# Patient Record
Sex: MALE | Race: WHITE | NOT HISPANIC OR LATINO | Employment: OTHER | URBAN - METROPOLITAN AREA
[De-identification: names, ages, dates, MRNs, and addresses within clinical notes are randomized per-mention and may not be internally consistent; named-entity substitution may affect disease eponyms.]

---

## 2021-05-04 PROBLEM — I10 ESSENTIAL HYPERTENSION: Status: ACTIVE | Noted: 2021-05-04

## 2021-05-04 PROBLEM — H93.13 TINNITUS, BILATERAL: Status: ACTIVE | Noted: 2021-05-04

## 2021-05-04 PROBLEM — E04.1 THYROID NODULE: Status: ACTIVE | Noted: 2021-05-04

## 2021-05-04 PROBLEM — N52.01 ERECTILE DYSFUNCTION DUE TO ARTERIAL INSUFFICIENCY: Status: ACTIVE | Noted: 2021-05-04

## 2021-05-04 PROBLEM — H91.93 BILATERAL HEARING LOSS: Status: ACTIVE | Noted: 2021-05-04

## 2021-05-04 PROBLEM — Z87.891 FORMER SMOKER: Status: ACTIVE | Noted: 2021-05-04

## 2021-10-04 ENCOUNTER — OFFICE VISIT (OUTPATIENT)
Dept: ENDOCRINOLOGY | Facility: MEDICAL CENTER | Age: 61
End: 2021-10-04
Attending: INTERNAL MEDICINE
Payer: COMMERCIAL

## 2021-10-04 ENCOUNTER — TELEPHONE (OUTPATIENT)
Dept: ENDOCRINOLOGY | Facility: MEDICAL CENTER | Age: 61
End: 2021-10-04

## 2021-10-04 VITALS
BODY MASS INDEX: 29.7 KG/M2 | HEART RATE: 66 BPM | HEIGHT: 68 IN | WEIGHT: 196 LBS | OXYGEN SATURATION: 93 % | SYSTOLIC BLOOD PRESSURE: 130 MMHG | DIASTOLIC BLOOD PRESSURE: 82 MMHG

## 2021-10-04 DIAGNOSIS — E55.9 VITAMIN D DEFICIENCY: ICD-10-CM

## 2021-10-04 DIAGNOSIS — F41.8 PERFORMANCE ANXIETY: ICD-10-CM

## 2021-10-04 DIAGNOSIS — E04.1 THYROID NODULE: ICD-10-CM

## 2021-10-04 DIAGNOSIS — N52.01 ERECTILE DYSFUNCTION DUE TO ARTERIAL INSUFFICIENCY: ICD-10-CM

## 2021-10-04 PROCEDURE — 99211 OFF/OP EST MAY X REQ PHY/QHP: CPT | Performed by: INTERNAL MEDICINE

## 2021-10-04 PROCEDURE — 99204 OFFICE O/P NEW MOD 45 MIN: CPT | Performed by: INTERNAL MEDICINE

## 2021-10-04 ASSESSMENT — PATIENT HEALTH QUESTIONNAIRE - PHQ9: CLINICAL INTERPRETATION OF PHQ2 SCORE: 0

## 2021-10-04 NOTE — TELEPHONE ENCOUNTER
1. Caller Name: Lamont Ballard                        Call Back Number: [phone] (home)      2. Message: Patient called in and I have spoke with him and he asked if we had his US report. I have looked into patient's media and we do have his US report from [facility].

## 2021-10-04 NOTE — PROGRESS NOTES
Chief Complaint: Consult requested by Rodney Cardoso M.D. for evaluation of Thyroid Nodule    HPI:     Lamont Ballard is a 61 y.o. male with history of Thyroid Nodule diagnosed 2010 discovered by his pcp in [place] and is here for initial evaluation.      Thyroid US from June 9/20/2021 was reviewed and discussed with the patient in detail.  This showed Dominant solid isoechoic nodule in the right isthmus bridge measuring 1.9 cm in the greatest dimension and a 4 mm cystic nodule in the left lobe.     He denies prior thyroid surgery.  He reports 2 biopsies for the same dominant right sided isthmus nodule returning back as indeterminate.  Genetic testing or molecular testing was not done.  This is why he was referred to endocrinology.    He reports that he had a TSH test with his primary care and it was normal.  We do not have records unfortunately.    He denies a family history of thyroid cancer.  He admits to radiation exposure in his teens as he worked in the nuclear power plant previously for 6 months.  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      ROS:      CONS:     No fever, no chills, no weight loss, no fatigue   EYES:      No diplopia, no blurry vision, no redness of eyes, no swelling of eyelids   ENT:    No hearing loss, No ear pain, No sore throat, no dysphagia, no neck swelling   CV:     No chest pain, no palpitations, no claudication, no orthopnea, no PND   PULM:    No SOB, no cough, no hemoptysis, no wheezing    GI:   No nausea, no vomiting, no diarrhea, no constipation, no bloody stools   :  Passing urine well, no dysuria, no hematuria   ENDO:   No polyuria, no polydipsia, no heat intolerance,  no cold intolerance   NEURO: No headaches, no dizziness, no convulsions, no tremors   MUSC:  No joint swellings, no arthralgias, no myalgias, no weakness   SKIN:   No rash, no ulcers, no dry skin   PSYCH:   No depression, no anxiety, no difficulty sleeping       Past Medical History:  Patient Active Problem List    Diagnosis Date Noted   • Former smoker 05/04/2021   • Thyroid nodule 05/04/2021   • Bilateral hearing loss 05/04/2021   • Erectile dysfunction due to arterial insufficiency 05/04/2021   • Essential hypertension 05/04/2021   • Tinnitus, bilateral 05/04/2021       Past Surgical History:  Past Surgical History:   Procedure Laterality Date   • ARTHROPLASTY          Allergies:  Penicillin g     Current Medications:    Current Outpatient Medications:   •  tadalafil (CIALIS) 20 MG tablet, Take 1 Tablet by mouth as needed for Erectile Dysfunction., Disp: 10 Tablet, Rfl: 3  •  propranolol (INDERAL) 10 MG Tab, Take 10 mg by mouth 3 times a day., Disp: , Rfl:   •  ciclopirox (PENLAC) 8 % solution, APPLY SOLUTION TO NAILS EVERY NIGHT AT BEDTIME, Disp: , Rfl:   •  ketoconazole (NIZORAL) 2 % Cream, APPLY TO FEET BILATERALLY EVERY DAY FOR 8 WEEKS, Disp: , Rfl:   •  urea 40 % Cream, APPLY TO FEET BILATERALLY TWICE DAILY FOR HYPERKERATOSIS, Disp: , Rfl:   •  Multiple Vitamins-Minerals (VITAMIN D3 COMPLETE PO), Take  by mouth., Disp: , Rfl:   •  MILK THISTLE PO, Take  by mouth., Disp: , Rfl:   •  B Complex-Folic Acid (B COMPLEX MAXI PO), Take  by mouth., Disp: , Rfl:   •  TURMERIC PO, Take  by mouth., Disp: , Rfl:   •  losartan (COZAAR) 50 MG Tab, Take 50 mg by mouth every day. Taking a half a pill. (Patient not taking: Reported on 10/4/2021), Disp: , Rfl:     Social History:  Social History     Socioeconomic History   • Marital status: Unknown     Spouse name: Not on file   • Number of children: Not on file   • Years of education: Not on file   • Highest education level: Not on file   Occupational History   • Not on file  "  Tobacco Use   • Smoking status: Never Smoker   • Smokeless tobacco: Never Used   Substance and Sexual Activity   • Alcohol use: Not Currently   • Drug use: Never   • Sexual activity: Not on file   Other Topics Concern   • Not on file   Social History Narrative   • Not on file     Social Determinants of Health     Financial Resource Strain:    • Difficulty of Paying Living Expenses:    Food Insecurity:    • Worried About Running Out of Food in the Last Year:    • Ran Out of Food in the Last Year:    Transportation Needs:    • Lack of Transportation (Medical):    • Lack of Transportation (Non-Medical):    Physical Activity:    • Days of Exercise per Week:    • Minutes of Exercise per Session:    Stress:    • Feeling of Stress :    Social Connections:    • Frequency of Communication with Friends and Family:    • Frequency of Social Gatherings with Friends and Family:    • Attends Sabianism Services:    • Active Member of Clubs or Organizations:    • Attends Club or Organization Meetings:    • Marital Status:    Intimate Partner Violence:    • Fear of Current or Ex-Partner:    • Emotionally Abused:    • Physically Abused:    • Sexually Abused:         Family History:   No family history on file.      PHYSICAL EXAM:   Vital signs: /82 (BP Location: Left arm, Patient Position: Sitting, BP Cuff Size: Adult)   Pulse 66   Ht 1.727 m (5' 8\")   Wt 88.9 kg (196 lb)   SpO2 93%   BMI 29.80 kg/m²   GENERAL: Well-developed, well-nourished  in no apparent distress.   EYE: No ocular and eyelid asymmetry, Anicteric sclerae,  PERRL, No exophthalmos or lidlag  HENT: Hearing grossly intact, Normocephalic, atraumatic. Pink, moist mucous membranes, No exudate  NECK: Supple. Trachea midline.  Thyroid is not enlarged there is a palpable dominant 2 cm solid nodule on the right side of the midline  CARDIOVASCULAR: Regular rate and rhythm. No murmurs, rubs, or gallops.   LUNGS: Clear to auscultation bilaterally   ABDOMEN: Soft, " nontender with positive bowel sounds.   EXTREMITIES: No clubbing, cyanosis, or edema.   NEUROLOGICAL: Cranial nerves II-XII are grossly intact   Symmetric reflexes at the patella no proximal muscle weakness, No visible tremor with both outstretched hands  LYMPH: No cervical, supraclavicular,  adenopathy palpated.   SKIN: No rashes, lesions. Turgor is normal.    Labs:  No results found for: WBC, RBC, HEMOGLOBIN, MCV, MCH, MCHC, RDW, MPV    No results found for: SODIUM, POTASSIUM, CHLORIDE, CO2, ANION, GLUCOSE, BUN, CREATININE, CALCIUM, ASTSGOT, ALTSGPT, TBILIRUBIN, ALBUMIN, TOTPROTEIN, GLOBULIN, AGRATIO    No results found for: TSHULTRASEN  No results found for: FREET4  No results found for: FREET3  No results found for: THYSTIMIG      Imaging:        ASSESSMENT/PLAN:     1. Thyroid nodule  Unstable  Addendum  patient forward me the test results of his cytology and molecular testing that was apparently done  Unfortunately he gave me wrong information yesterday and told me that molecular testing was not done  But apparently molecular testing was done at [facility]     His biopsy at [facility] was done on March 6, 2020 it came back atypical follicular lesion cytology  Molecular testing by [facility] was done and this came back suspicious for malignancy with risk of malignancy of 50%    I contacted the patient and told him that he should schedule surgery with Dr. Young  I recommend at the very least an isthmusectomy for his focal right bridge isthmus nodule or a right hemithyroidectomy      Addendum:    His original biopsy came back as [place] class III and then [facility] molecular testing was done which showed suspicious for malignancy  When I discovered this I recommended surgery for the patient  Discussed with him that if he undergoes a total thyroidectomy he will definitely need thyroid hormone replacement therapy for the rest of his life  However if he only undergoes nodule removal or nodulectomy there is a  possibility that he might not need thyroid hormone  He informed me that Dr. Young was considering just removing his nodule  I personally recommend a right hemithyroidectomy      Patient sent me messages on my phone that he wants another biopsy  He feels that Dr. Thao who is an endocrinologist in [place] falsified the reports of his biopsy.    I explained to the patient that if he wants another biopsy this is fine  There is a repeat biopsy ordered in the [facility] system and he has to call radiology to schedule the biopsy      2. Performance anxiety  Stable continue propanolol as needed    3. Erectile dysfunction due to arterial insufficiency  Stable continue tadalafil as needed    4. Vitamin D deficiency  We will check calcium vitamin D with his next labs      Return in about 6 months (around 4/4/2022).      Thank you kindly for allowing me to participate in the thyroid care plan for this patient.    Andrew Deng MD, [facility], [facility]  10/04/21    CC:   Rodney Cardoso M.D.

## 2021-11-19 PROBLEM — J45.20 INTERMITTENT ASTHMA: Status: ACTIVE | Noted: 2021-11-19

## 2022-04-05 ENCOUNTER — OFFICE VISIT (OUTPATIENT)
Dept: ENDOCRINOLOGY | Facility: MEDICAL CENTER | Age: 62
End: 2022-04-05
Attending: INTERNAL MEDICINE
Payer: COMMERCIAL

## 2022-04-05 VITALS
DIASTOLIC BLOOD PRESSURE: 98 MMHG | OXYGEN SATURATION: 95 % | HEART RATE: 77 BPM | SYSTOLIC BLOOD PRESSURE: 140 MMHG | HEIGHT: 68 IN | BODY MASS INDEX: 30.16 KG/M2 | WEIGHT: 199 LBS

## 2022-04-05 DIAGNOSIS — E55.9 VITAMIN D DEFICIENCY: ICD-10-CM

## 2022-04-05 DIAGNOSIS — F41.8 PERFORMANCE ANXIETY: ICD-10-CM

## 2022-04-05 DIAGNOSIS — E04.1 THYROID NODULE: ICD-10-CM

## 2022-04-05 PROCEDURE — 99211 OFF/OP EST MAY X REQ PHY/QHP: CPT | Performed by: INTERNAL MEDICINE

## 2022-04-05 PROCEDURE — 99215 OFFICE O/P EST HI 40 MIN: CPT | Performed by: INTERNAL MEDICINE

## 2022-04-05 ASSESSMENT — FIBROSIS 4 INDEX: FIB4 SCORE: 0.78

## 2022-04-06 NOTE — PROGRESS NOTES
"Chief Complaint: Follow up for thyroid nodule    HPI:     Lamont Ballard is a 61 y.o. male here for follow up of of the above medical issue    I initially saw the patient has referral from Dr. Rodney Gonzalez for a thyroid nodule that was first diagnosed in 2010 by his PCP  When I first saw him in consultation we did not have any records    He has a dominant right isthmus bridge nodule measuring 1.9 cm detected on ultrasound from September 20, 2021    He was also claiming that another endocrinologist Dr. Thao had falsified the results of his biopsy which was unclear to me      He claimed that the results of his biopsy for his nodule was indeterminate and molecular testing was not done    But this was not true because after his visit we got records from the patient that he forwarded to us via text.       His original biopsy at [facility] came back as [place] class III and molecular testing was done with [facility] showed findings suspicious for malignancy with risk of malignancy of 50%    After this discovery I encouraged him to follow-up with his ENT Dr. Young and I had a very long discussion with his ENT a few days after I saw him,  and Dr. Young also agreed with surgery.            On follow-up I initially had a very good discussion with the patient he told me that he has been following up with Dr. Young and is going to have surgery       His most recent TSH is normal 1.2 with a vitamin D of 58 and calcium of 9.2 and serum creatinine of 1.2 on March 31, 2022        But during the visit he switched gears and stated that Dr. Young has a back/shoulder injury and now he is going to see his partner Dr. Barry.  He expressed his doubts about having thyroid surgery on the grounds that the \"medical system is breaking down\".  He is worried that there are supply chain issues and patients are not going to be able to get their thyroid hormone and he is worried that he is not going to be able to get thyroid " hormone after surgery because of breakdown of the medical system.      He went off track with this discussion and was citing how [facility] medical system is failing because they were hacked and doctors and nurses are not getting paid.  He also talked about how President Merlin Rebollar has issued an order for the [facility] providers to take care of patients who cross the border and not take care of [facility] patients.  He also cited his wife's experience as an example and he claimed that his wife was getting levothyroxine from [facility] and its not working and it is defective.       He informed me that he would rather have thyroid cancer that is slow-growing instead of having his thyroid removed and taking thyroid hormone.        I did explain to him that there is no guarantee that he will be on thyroid hormone because he might possibly just undergo a right hemithyroidectomy and not a total thyroidectomy.  I explained to him that if he undergoes a right hemithyroidectomy there is a 10% chance that he might need thyroid hormone.  We also discussed the potential adverse outcomes of not having surgery for a potential thyroid cancer including the risk of metastasis, the risk of laryngeal nerve compromise, and other potential fatal outcomes.      I tried my best to redirect him during this visit and encounter and explained to him that I do not believe that the medical system is breaking down.  I explained to him that patients are undergoing surgery at [facility] and other medical systems and are having good outcomes.  I explained to him that our patients are currently not experiencing any supply chain issues with regards to her thyroid hormone medications.        Patient's medications, allergies, and social histories were reviewed and updated as appropriate.      ROS:     CONS:     No fever, no chills   EYES:     No diplopia, no blurry vision   CV:           No chest pain, no palpitations   PULM:     No SOB, no cough, no hemoptysis.   GI:             "No nausea, no vomiting, no diarrhea, no constipation   ENDO:     No polyuria, no polydipsia, no heat intolerance, no cold intolerance       Past Medical History:  Problem List:  2021-11: Intermittent asthma  2021-05: Former smoker  2021-05: Thyroid nodule  2021-05: Bilateral hearing loss  2021-05: Erectile dysfunction due to arterial insufficiency  2021-05: Essential hypertension  2021-05: Tinnitus, bilateral      Past Surgical History:  Past Surgical History:   Procedure Laterality Date   • ARTHROPLASTY          Allergies:  Azithromycin and Penicillin g     Social History:  Social History     Tobacco Use   • Smoking status: Never Smoker   • Smokeless tobacco: Never Used   Vaping Use   • Vaping Use: Never used   Substance Use Topics   • Alcohol use: Yes     Comment: once a week socially   • Drug use: Never        Family History:   family history is not on file.      PHYSICAL EXAM:   Vital signs: /98 (BP Location: Left arm, Patient Position: Sitting, BP Cuff Size: Adult)   Pulse 77   Ht 1.734 m (5' 8.25\")   Wt 90.3 kg (199 lb)   SpO2 95%   BMI 30.04 kg/m²   GENERAL: Well-developed, well-nourished in no apparent distress.   EYE:  No ocular asymmetry, PERRLA  HENT: Pink, moist mucous membranes.    NECK: No thyromegaly.   CARDIOVASCULAR:  No murmurs  LUNGS: Clear breath sounds  ABDOMEN: Soft, nontender   EXTREMITIES: No clubbing, cyanosis, or edema.   NEUROLOGICAL: No gross focal motor abnormalities   LYMPH: No cervical adenopathy palpated.   SKIN: No rashes, lesions.       Labs:  Lab Results   Component Value Date/Time    SODIUM 139 03/31/2022 02:45 PM    POTASSIUM 4.1 03/31/2022 02:45 PM    CHLORIDE 105 03/31/2022 02:45 PM    CO2 25 03/31/2022 02:45 PM    ANION 13 03/31/2022 02:45 PM    GLUCOSE 90 03/31/2022 02:45 PM    BUN 16 03/31/2022 02:45 PM    CREATININE 1.2 03/31/2022 02:45 PM    CALCIUM 9.2 03/31/2022 02:45 PM    ASTSGOT 21 03/31/2022 02:45 PM    ALTSGPT 24 03/31/2022 02:45 PM    TBILIRUBIN 0.9 " 03/31/2022 02:45 PM    ALBUMIN 3.7 03/31/2022 02:45 PM    TOTPROTEIN 6.7 03/31/2022 02:45 PM    AGRATIO 1.2 03/31/2022 02:45 PM       Lab Results   Component Value Date/Time    SODIUM 139 03/31/2022 1445    POTASSIUM 4.1 03/31/2022 1445    CHLORIDE 105 03/31/2022 1445    CO2 25 03/31/2022 1445    GLUCOSE 90 03/31/2022 1445    BUN 16 03/31/2022 1445    CREATININE 1.2 03/31/2022 1445    CALCIUM 9.2 03/31/2022 1445    ANION 13 03/31/2022 1445       No results found for: CHOLSTRLTOT, TRIGLYCERIDE, HDL, LDL, CHOLHDLRAT, NONHDL    Lab Results   Component Value Date/Time    TSHULTRASEN 1.28 03/31/2022 1445     Lab Results   Component Value Date/Time    FREET4 1.01 03/31/2022 1445     No results found for: FREET3  No results found for: THYSTIMIG    No results found for: MICROSOMALA      Imaging:      ASSESSMENT/PLAN:     1. Thyroid nodule  I explained and went over the fact with the patient that he has a thyroid nodule with a suspicious biopsy based on molecular testing  I recommend surgery  He unfortunately got off track during our discussion today and was citing political issues that are not related to the current discussion  He also talked about concerns about supply chain issues for medications that are currently not evident  I believe that he is trying to find a reason not to have surgery  At this time I want him to follow-up with ENT because he really needs to undergo surgery for his thyroid nodule  I am not making him a follow-up appointment at this time because he is undecided with respect to surgery   and I really recommend surgery based on available information on hand.    2. Vitamin D deficiency  Controlled continue vitamin D    3. Performance anxiety  Unstable  I want him to follow-up with his primary care physician for this condition      Return if symptoms worsen or fail to improve.    Total time spent on date of service was over 40 minutes which included discussion of thyroid surgery, possible outcomes of  right hemithyroidectomy versus total thyroidectomy, possible outcomes of not treating a potential thyroid cancer      Thank you kindly for allowing me to participate in the thyroid care plan for this patient.    Andrew Deng MD, [facility], [facility]  04/05/22    CC:   Rodney Cardoso M.D.

## 2022-05-24 PROBLEM — K21.9 GASTROESOPHAGEAL REFLUX DISEASE: Status: ACTIVE | Noted: 2022-05-24

## 2022-06-22 DIAGNOSIS — E04.1 THYROID NODULE: ICD-10-CM

## 2022-06-23 NOTE — PROGRESS NOTES
Patient's primary care provider Dr. Gonzalez sent me a message that the patient was followed by Dr. Young as such he is asking me to refer the patient to a different surgeon.  I am referring the patient to Dr. Sylvia Manriquez.  Prior to sending the referral I attempted to call the patient to let him know that this referral is being done.

## 2022-07-12 PROBLEM — N40.0 PROSTATISM: Status: ACTIVE | Noted: 2022-07-12

## 2022-07-12 PROBLEM — Z80.0 FAMILY HISTORY OF COLON CANCER REQUIRING SCREENING COLONOSCOPY: Status: ACTIVE | Noted: 2022-07-12

## 2022-07-19 ENCOUNTER — HOSPITAL ENCOUNTER (OUTPATIENT)
Dept: RADIOLOGY | Facility: MEDICAL CENTER | Age: 62
End: 2022-07-19
Payer: COMMERCIAL

## 2022-08-02 ENCOUNTER — HOSPITAL ENCOUNTER (OUTPATIENT)
Dept: RADIOLOGY | Facility: MEDICAL CENTER | Age: 62
End: 2022-08-02
Attending: SURGERY
Payer: COMMERCIAL

## 2022-08-02 DIAGNOSIS — E04.2 NONTOXIC MULTINODULAR GOITER: ICD-10-CM

## 2022-08-02 PROCEDURE — 10005 FNA BX W/US GDN 1ST LES: CPT

## 2022-08-02 PROCEDURE — 88112 CYTOPATH CELL ENHANCE TECH: CPT

## 2022-08-02 RX ORDER — LIDOCAINE HYDROCHLORIDE 10 MG/ML
INJECTION, SOLUTION EPIDURAL; INFILTRATION; INTRACAUDAL; PERINEURAL
Status: DISCONTINUED
Start: 2022-08-02 | End: 2022-08-03 | Stop reason: HOSPADM

## 2022-08-02 NOTE — PROGRESS NOTES
US guided OPIR thyroid nodule fine needle aspiration done by Dr. Grande; NON-SEDATION (no H&P required as this is a NON SEDATION procedure) mid anterior aspect of neck access site; 1 jar of cytolyt, 1 vial AFIRMA obtained and sent to pathology lab; pt tolerated the procedure well; pt hemodynamically stable pre/intra/post procedure; all questions and concerns answered prior to being d/c; patient provided with appropriate education for procedure; pt d/c home.

## 2022-08-03 LAB — CYTOLOGY REG CYTOL: NORMAL

## 2022-10-04 ENCOUNTER — OFFICE VISIT (OUTPATIENT)
Dept: ENDOCRINOLOGY | Facility: MEDICAL CENTER | Age: 62
End: 2022-10-04
Attending: INTERNAL MEDICINE
Payer: COMMERCIAL

## 2022-10-04 VITALS
OXYGEN SATURATION: 96 % | WEIGHT: 193 LBS | SYSTOLIC BLOOD PRESSURE: 144 MMHG | DIASTOLIC BLOOD PRESSURE: 100 MMHG | HEART RATE: 74 BPM | HEIGHT: 68 IN | BODY MASS INDEX: 29.25 KG/M2

## 2022-10-04 DIAGNOSIS — F41.8 PERFORMANCE ANXIETY: ICD-10-CM

## 2022-10-04 DIAGNOSIS — E04.1 THYROID NODULE: ICD-10-CM

## 2022-10-04 DIAGNOSIS — E55.9 VITAMIN D DEFICIENCY: ICD-10-CM

## 2022-10-04 PROCEDURE — 99214 OFFICE O/P EST MOD 30 MIN: CPT | Performed by: INTERNAL MEDICINE

## 2022-10-04 PROCEDURE — 99211 OFF/OP EST MAY X REQ PHY/QHP: CPT | Performed by: INTERNAL MEDICINE

## 2022-10-04 ASSESSMENT — FIBROSIS 4 INDEX: FIB4 SCORE: 1.34

## 2022-10-04 NOTE — PROGRESS NOTES
Chief Complaint: Follow up for thyroid nodule    HPI:     Lamont Ballard is a 62 y.o. male here for follow up of of the above medical issue    I initially saw the patient has referral from Dr. Rodney Gonzalez for a thyroid nodule that was first diagnosed in 2010 by his PCP  When I first saw him in consultation we did not have any records    He has a dominant right isthmus bridge nodule measuring 1.9 cm detected on ultrasound from September 20, 2021    He was also claiming that another endocrinologist Dr. 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 Sylvia Manriquez  Who ordered repeat FNA of the nodule and it came back as benign on 8/2/2022      He is very happy that his repeat biopsy was benign  He now thinks that his previous biopsies done by other physicians were not ultrasound-guided  I am unable to confirm this.    Because his biopsy was benign I recommend observation of his thyroid nodule with routine ultrasound          Patient's medications, allergies, and social histories were reviewed and updated as appropriate.      ROS:     CONS:     No fever, no chills   EYES:     No diplopia, no blurry vision   CV:           No chest pain, no palpitations   PULM:     No SOB, no cough, no hemoptysis.   GI:            No nausea, no vomiting, no diarrhea, no constipation   ENDO:     No polyuria, no polydipsia, no heat intolerance, no cold intolerance       Past Medical History:  Problem List:  2022-07: Family history of colon cancer requiring screening  "  colonoscopy  2022-07: Prostatism  2022-05: Gastroesophageal reflux disease  2021-11: Intermittent asthma  2021-05: Former smoker  2021-05: Thyroid nodule  2021-05: Bilateral hearing loss  2021-05: Erectile dysfunction due to arterial insufficiency  2021-05: Essential hypertension  2021-05: Tinnitus, bilateral    Past Surgical History:  Past Surgical History:   Procedure Laterality Date    ARTHROPLASTY          Allergies:  Azithromycin and Penicillin g     Social History:  Social History     Tobacco Use    Smoking status: Never    Smokeless tobacco: Never   Vaping Use    Vaping Use: Never used   Substance Use Topics    Alcohol use: Yes     Comment: once a week socially    Drug use: Never        Family History:   family history is not on file.      PHYSICAL EXAM:   Vital signs: BP (!) 144/100 (BP Location: Left arm, Patient Position: Sitting, BP Cuff Size: Adult)   Pulse 74   Ht 1.727 m (5' 8\")   Wt 87.5 kg (193 lb)   SpO2 96%   BMI 29.35 kg/m²   GENERAL: Well-developed, well-nourished in no apparent distress.   EYE:  No ocular asymmetry, PERRLA  HENT: Pink, moist mucous membranes.    NECK: No thyromegaly.   CARDIOVASCULAR:  No murmurs  LUNGS: Clear breath sounds  ABDOMEN: Soft, nontender   EXTREMITIES: No clubbing, cyanosis, or edema.   NEUROLOGICAL: No gross focal motor abnormalities   LYMPH: No cervical adenopathy palpated.   SKIN: No rashes, lesions.       Labs:  Lab Results   Component Value Date/Time    SODIUM 141 06/30/2022 10:50 AM    POTASSIUM 4.1 06/30/2022 10:50 AM    CHLORIDE 104 06/30/2022 10:50 AM    CO2 30 06/30/2022 10:50 AM    ANION 11 06/30/2022 10:50 AM    GLUCOSE 91 06/30/2022 10:50 AM    BUN 13 06/30/2022 10:50 AM    CREATININE 1.3 06/30/2022 10:50 AM    CALCIUM 9.0 06/30/2022 10:50 AM    ASTSGOT 21 03/31/2022 02:45 PM    ALTSGPT 24 03/31/2022 02:45 PM    TBILIRUBIN 0.9 03/31/2022 02:45 PM    ALBUMIN 3.7 03/31/2022 02:45 PM    TOTPROTEIN 6.7 03/31/2022 02:45 PM    AGRATIO 1.2 03/31/2022 " 02:45 PM       Lab Results   Component Value Date/Time    SODIUM 139 03/31/2022 1445    POTASSIUM 4.1 03/31/2022 1445    CHLORIDE 105 03/31/2022 1445    CO2 25 03/31/2022 1445    GLUCOSE 90 03/31/2022 1445    BUN 16 03/31/2022 1445    CREATININE 1.2 03/31/2022 1445    CALCIUM 9.2 03/31/2022 1445    ANION 13 03/31/2022 1445       No results found for: CHOLSTRLTOT, TRIGLYCERIDE, HDL, LDL, CHOLHDLRAT, NONHDL    Lab Results   Component Value Date/Time    TSHULTRASEN 1.28 03/31/2022 1445     Lab Results   Component Value Date/Time    FREET4 1.01 03/31/2022 1445       [facility]                           DEPARTMENT OF PATHOLOGY                    [address] [place] [address]  [id number]               Phone [phone]          Fax [phone]   Mal Pelaez M.D.     Guillermina Almonte M.D.     Lavell Lozano M.D.                             Miryam Pastrana M.D.     Kristina Rice D.O.     MALLORY Rosario M.D.     Patient:    OSEI ALVAREZ        Specimen    [id number]                                            #:       Copies to:             VIKKI JANSEN MD                         SURGICAL PATHOLOGY CONSULTATION       FINAL DIAGNOSIS:     A. Right thyroid isthmus fine needle aspiration:          Todd System category II: Benign.          Thin Prep slide demonstrates benign clusters of follicular cells           in a background of macrophages and abundant hard colloid.           Specimen is adequate for evaluation. No cytologic features           suggestive of papillary carcinoma identified.            Comment: These results should be interpreted in the context of           the limited sampling inherent in fine needle aspiration with           repeat studies if the FNA diagnosis does not correlate with the           clinical findings. Todd System category II, benign has a           risk of malignancy that is up to 3%.  Afirma specimen is not            sent as the specimen is category II not III or IV per protocol.     Imaging:  US-THYROID  Order: [id number]  Impression        1.  1.9 cm right isthmus nodule, unchanged in the prior study. No FNA   recommended at this time. Annual surveillance is now recommended.     Recommendations are based on the American College of Radiology Thyroid Imaging,   Reporting and Data System (TI-RADS) committee recommendations.     Electronically Signed by: Micah Gutierrez MD 5/6/2021 3:13 PM  Narrative    EXAM: Thyroid sonogram.     TECHNIQUE: High resolution sonographic imaging of the thyroid gland was   performed.     HISTORY: NODULE     COMPARISON: January 24, 2020     FINDINGS:       Right: Measures 4.9 x 1.9 x 2.1 cm. Echotexture of the right thyroid lobe is   unremarkable. No nodule noted.       Left: Measures 5.5 x 1.5 x 1.9 cm. There is a 4 mm cystic nodule within the mid   left thyroid.       Isthmus: There is a 1.4 x 0.8 x 1.9 cm solid isoechoic nodule (TR 3) within the   right isthmus.     ASSESSMENT/PLAN:     1. Thyroid nodule  Stable  Repeat biopsy shows benign findings  I want him to follow-up with me in 3 months I can repeat his thyroid ultrasound in the office  I want him to get labs including a TSH to help reassure that his thyroid function is stable  We will discuss his labs on follow-up  We will also discuss his ultrasound results during the procedure visit      2. Vitamin D deficiency  Stable  We will check calcium and vitamin D with future labs    3. Performance anxiety  This is improved continue monitoring        Thank you kindly for allowing me to participate in the thyroid care plan for this patient.    Andrew Deng MD, [facility], [facility]  04/05/22    CC:   Rodney Cardoso M.D.

## 2023-02-09 ENCOUNTER — APPOINTMENT (OUTPATIENT)
Dept: RADIOLOGY | Facility: MEDICAL CENTER | Age: 63
End: 2023-02-09
Attending: INTERNAL MEDICINE
Payer: COMMERCIAL

## 2023-02-22 ENCOUNTER — TELEPHONE (OUTPATIENT)
Dept: CARDIOLOGY | Facility: MEDICAL CENTER | Age: 63
End: 2023-02-22
Payer: COMMERCIAL

## 2023-02-22 DIAGNOSIS — R07.2 PRECORDIAL PAIN: ICD-10-CM

## 2023-02-22 NOTE — TELEPHONE ENCOUNTER
CW    Caller: Elkin -[facility]     Topic/issue:  elkin states she is returning a call regarding a canceled stress test .     Callback Number:  [phone]    She asks a VM is left and a direct line to reach back out to .    Thank you   Neli JENKINS

## 2023-02-22 NOTE — TELEPHONE ENCOUNTER
CW    Caller: Sujatha from [facility]    Topic/issue: Sujatha states patient was scheduled for a stress test on 02- but then it was cancelled because they had said Dr. Shearer cancelled the order but they were under the impression he wanted this done.     Callback Number: [phone]    Thank you,  -Bell BOWEN

## 2023-02-23 NOTE — TELEPHONE ENCOUNTER
"To CW, pt states you cancelled stress test order, but it is still active.  Pt cancelled his scheduled stress test for 2/24.  Please advise, thank you!    ===================    Upon chart review per MD last OV note, \"Hopefully has improvement with anti-inflammatories and then it is quite reasonable to do the stress test ischemic heart disease overall.\"  "

## 2023-02-24 ENCOUNTER — PATIENT MESSAGE (OUTPATIENT)
Dept: CARDIOLOGY | Facility: MEDICAL CENTER | Age: 63
End: 2023-02-24
Payer: COMMERCIAL

## 2023-02-24 NOTE — TELEPHONE ENCOUNTER
To , please fax NM Heart muscle image ordered by CW to [facility], thank you!    Attempted to return Candace's call, [phone], unable to reach.  Detailed voicemail left regarding findings and to return this call if they have further questions or concerns.    MyChart sent regarding findings.    Order modified with request to fax to [facility].     18

## 2023-02-27 NOTE — TELEPHONE ENCOUNTER
NM Heart Muscle Imaging order faxed to [facility]. Fax confirmation received.

## 2023-02-27 NOTE — TELEPHONE ENCOUNTER
CW    Good Afternoon,    Sujatha from [facility] called [place] to state she did received the ancillary order for the stress test and will be communicating with the pt over scheduling and costs..      Thank you,    JUAN RAMON

## 2023-08-15 ENCOUNTER — OFFICE VISIT (OUTPATIENT)
Dept: DERMATOLOGY | Facility: IMAGING CENTER | Age: 63
End: 2023-08-15
Payer: COMMERCIAL

## 2023-08-15 DIAGNOSIS — L82.1 SK (SEBORRHEIC KERATOSIS): ICD-10-CM

## 2023-08-15 DIAGNOSIS — Z12.83 SKIN CANCER SCREENING: ICD-10-CM

## 2023-08-15 DIAGNOSIS — L81.4 LENTIGINES: ICD-10-CM

## 2023-08-15 DIAGNOSIS — B35.1 ONYCHOMYCOSIS: ICD-10-CM

## 2023-08-15 DIAGNOSIS — D18.01 CHERRY ANGIOMA: ICD-10-CM

## 2023-08-15 DIAGNOSIS — Z85.828 HISTORY OF BASAL CELL CARCINOMA (BCC): ICD-10-CM

## 2023-08-15 DIAGNOSIS — D22.9 MULTIPLE NEVI: ICD-10-CM

## 2023-08-15 PROCEDURE — 17110 DESTRUCTION B9 LES UP TO 14: CPT | Performed by: NURSE PRACTITIONER

## 2023-08-15 PROCEDURE — 99213 OFFICE O/P EST LOW 20 MIN: CPT | Mod: 25 | Performed by: NURSE PRACTITIONER

## 2023-08-15 NOTE — PROGRESS NOTES
DERMATOLOGY NOTE  NEW VISIT       Chief complaint: Establish Care and Skin Lesion (HANY )         Currently applying OUHOE OTC nail fungus repairing liquid     HPI:  skin lesion   Location:  above left ear   Time present: 2 years   Painful lesion: No  Itching lesion: No  Enlarging lesion: No  Anything make it better or worse?no     HPI: skin lesion   Location:  scalp   Time present: 5 years   Painful lesion: Yes  Itching lesion: Yes  Enlarging lesion: Yes  Anything make it better or worse?no         History of skin cancer: Yes, Details: BCC back 2010   History of precancers/actinic keratoses: No  History of biopsies:Yes, Details:    History of blistering/severe sunburns:Yes, Details:    Family history of skin cancer:Yes, Details: father SCC , melanoma   Family history of atypical moles:No      Allergies   Allergen Reactions    Azithromycin Rash     Rash upper body    Penicillin G Hives and Unspecified        MEDICATIONS:  Medications relevant to specialty reviewed.     REVIEW OF SYSTEMS:   Positive for skin (see HPI)  Negative for fevers and chills       EXAM:  There were no vitals taken for this visit.  Constitutional: Well-developed, well-nourished, and in no distress.     A total body skin exam was performed excluding the genitals per patient preference and including the following areas: head (including face), neck, chest, abdomen, groin/buttocks, back, bilateral upper extremities, and bilateral lower extremities with the following pertinent findings listed below and/or in assessment/plan.     Several scattered 1-3mm bright red macules and thin papules on the scalp trunk, traumatized angioma on L parietal scalp  Several tan skin-colored stuck-on waxy papules scattered on the  scalp trunk and extremities  -sun exposed skin of trunk and b/l upper, lower extremities and face with scattered clinically benign light brown reticulated macules all of which were morphologically similar and none of which were suspicious for  skin cancer today on exam  Multiple tan medium brown skin-colored macules papules scattered over the trunk, face and extremities--.skdeerm  Epidermal cyst left mid back   Thickened and discolored bilateral great toe nails , left second toe       IMPRESSION / PLAN:    1. Cherry angioma  - Benign-appearing nature of lesions discussed during exam.   CRYOTHERAPY:  Risks (including, but not limited to: skin discoloration, redness, blister, blood blister, recurrence, need for further treatment, infection, scar) and benefits of cryotherapy discussed. Patient verbally agreed to proceed with treatment. 1 cryotherapy freeze thaw cycles of 10 seconds were applied to 1 traumatized lesion on L parietal scalp with cryac. Patient tolerated procedure well. Aftercare instructions given--no specific care needed unless irritated during healing process, can apply Vaseline with small band-aid if needed.  - Advised to continue to monitor for any return to clinic for new or concerning changes.      2. SK (seborrheic keratosis)  - Benign-appearing nature of lesions discussed during exam.   - Courtesy LN2 applied to SK on vertex scalp, will get traumatized with haircuts  - Advised to continue to monitor for any return to clinic for new or concerning changes.      3. Multiple nevi  - Benign-appearing nature of lesions discussed during exam.   - Advised to continue to monitor for any return to clinic for new or concerning changes.  - ABCDE's of melanoma discussed/handout given      4. Lentigines  - Benign-appearing nature of lesions discussed during exam.   - Advised to continue to monitor for any return to clinic for new or concerning changes.      5. Onychomycosis  Discussed course/nature of disease  Discussed various tx options--topicals, systemic medication, OTC tx and laser, in severe  cases, toenail removal    6. History of basal cell carcinoma (BCC)  Regularly scheduled TSEs    7. Skin cancer screening  Skin cancer education  discussed  importance of sun protective clothing, eyewear in addition to the use of broad spectrum sunscreen with SPF 30 or greater, as well as need for reapplication ~every 2 hours when exposed to UVR/handout given  discussed importance following up for any new or changing lesions as noted in handout given, but every 12 months exams in clinic in the setting of dermatologic history  ABCDE's of melanoma discussed/handout given        Discussed risks associated with LN2, Patient verbalized understanding and agrees with plan regarding the above          Please note that this dictation was created using voice recognition software. I have made every reasonable attempt to correct obvious errors, but I expect that there are errors of grammar and possibly content that I did not discover before finalizing the note.      Return to clinic in: Return in about 1 year (around 8/15/2024) for HANY. and as needed for any new or changing skin lesions.

## 2023-10-06 ENCOUNTER — TELEPHONE (OUTPATIENT)
Dept: ENDOCRINOLOGY | Facility: MEDICAL CENTER | Age: 63
End: 2023-10-06
Payer: COMMERCIAL

## 2024-04-10 ENCOUNTER — OFFICE VISIT (OUTPATIENT)
Dept: DERMATOLOGY | Facility: IMAGING CENTER | Age: 64
End: 2024-04-10
Payer: COMMERCIAL

## 2024-04-10 DIAGNOSIS — L82.0 LICHENOID KERATOSIS: ICD-10-CM

## 2024-04-10 DIAGNOSIS — L57.0 ACTINIC KERATOSIS: ICD-10-CM

## 2024-04-10 DIAGNOSIS — L72.0 EPIDERMAL CYST: ICD-10-CM

## 2024-04-10 PROCEDURE — 17000 DESTRUCT PREMALG LESION: CPT | Performed by: NURSE PRACTITIONER

## 2024-04-10 PROCEDURE — 99213 OFFICE O/P EST LOW 20 MIN: CPT | Mod: 25 | Performed by: NURSE PRACTITIONER

## 2024-04-10 NOTE — PROGRESS NOTES
DERMATOLOGY NOTE  FOLLOW UP VISIT       Chief complaint: Skin Lesion     HPI/location: rt cheek   Time present: 08/2023      History of skin cancer: Yes, Details: BCC back 2010   History of precancers/actinic keratoses: No  History of biopsies:Yes, Details:    History of blistering/severe sunburns:Yes, Details:    Family history of skin cancer:Yes, Details: father SCC , melanoma   Family history of atypical moles:No      Allergies   Allergen Reactions    Azithromycin Rash     Rash upper body    Penicillin G Hives and Unspecified        MEDICATIONS:  Medications relevant to specialty reviewed.     REVIEW OF SYSTEMS:   Positive for skin (see HPI)  Negative for fevers and chills       EXAM:  There were no vitals taken for this visit.  Constitutional: Well-developed, well-nourished, and in no distress.     A focused skin exam was performed including the affected areas of the face. Notable findings on exam today listed below and/or in assessment/plan.     Approx. 1 cm cystic structure with visible punctum to L back, not abscessed, no drainage  Ill-defined erythematous gritty/scaly papule over the R cheek  2 mildly inflamed papules just at the dorsal base of penis      IMPRESSION / PLAN:      1. Actinic keratosis  CRYOTHERAPY:  Risks (including, but not limited to: skin discoloration, redness, blister, blood blister, recurrence, need for further treatment, infection, scar) and benefits of cryotherapy discussed. Patient verbally agreed to proceed with treatment. 1 cryotherapy freeze thaw cycles of 10 seconds were applied to 1 lesion on R cheek with cryac. Patient tolerated procedure well. Aftercare instructions given--no specific care needed unless irritated during healing process, can apply Vaseline with small band-aid if needed.      2. Lichenoid keratosis, favored diagnosis  Advised follow up if does not improve with time    3. Epidermal cyst  - Benign-appearing nature of lesions discussed during exam.   - Advised to  continue to monitor for any return to clinic for new or concerning changes.        Please note that this dictation was created using voice recognition software. I have made every reasonable attempt to correct obvious errors, but I expect that there are errors of grammar and possibly content that I did not discover before finalizing the note.      Return to clinic in: Return in about 4 months (around 8/10/2024) for HANY. and as needed for any new or changing skin lesions.

## 2024-05-15 ENCOUNTER — OFFICE VISIT (OUTPATIENT)
Dept: DERMATOLOGY | Facility: IMAGING CENTER | Age: 64
End: 2024-05-15
Payer: COMMERCIAL

## 2024-05-15 DIAGNOSIS — D48.9 NEOPLASM OF UNCERTAIN BEHAVIOR: ICD-10-CM

## 2024-05-15 PROCEDURE — 11102 TANGNTL BX SKIN SINGLE LES: CPT | Performed by: NURSE PRACTITIONER

## 2024-05-15 NOTE — PROGRESS NOTES
DERMATOLOGY NOTE  FOLLOW UP VISIT       Chief complaint: Follow-Up (Possible Bx)     Pt here today for possible bx on base of penis. Pt states it has grown        History of skin cancer: Yes, Details: BCC back 2010   History of precancers/actinic keratoses: No  History of biopsies:Yes, Details:    History of blistering/severe sunburns:Yes, Details:    Family history of skin cancer:Yes, Details: father SCC , melanoma   Family history of atypical moles:No      Allergies   Allergen Reactions    Azithromycin Rash     Rash upper body    Penicillin G Hives and Unspecified        MEDICATIONS:  Medications relevant to specialty reviewed.     REVIEW OF SYSTEMS:   Positive for skin (see HPI)  Negative for fevers and chills       EXAM:  There were no vitals taken for this visit.  Constitutional: Well-developed, well-nourished, and in no distress.     A focused skin exam was performed including the affected areas of the genital region. Notable findings on exam today listed below and/or in assessment/plan.       Approx. 6 mm pink and  violaceous papule to base of penis with 3 smaller similar papules to same areas      IMPRESSION / PLAN:    1. Neoplasm of uncertain behavior  Procedure Note   Procedure: Biopsy by shave technique  Location: base of penis  Size: as noted in exam  Preoperative diagnosis:verruca vs other  Risks, benefits and alternatives of procedure discussed, verbal consent obtained for photo (see chart) and written informed consent obtained for procedure. Time out completed. Area of biopsy prepped with alcohol. Anesthesia with 1% lidocaine with epinephrine administered with 30 gauge needle. Shave biopsy of the site performed. Hemostasis achieved with pressure and aluminum chloride. Vaseline applied to wound with bandage. Patient tolerated procedure well and there were no complications. The specimen was sent to the pathology lab by the staff. Wound care was discussed.      Discussed risks, benefits, alternative  treatments associated with shave bx,  Patient verbalized understanding and agrees with plan regarding the above        Please note that this dictation was created using voice recognition software. I have made every reasonable attempt to correct obvious errors, but I expect that there are errors of grammar and possibly content that I did not discover before finalizing the note.      Return to clinic in: Return for Pending Bx results. and as needed for any new or changing skin lesions.

## 2024-05-28 DIAGNOSIS — L43.9 LICHEN PLANUS: ICD-10-CM

## 2024-05-28 RX ORDER — CLOBETASOL PROPIONATE 0.5 MG/G
OINTMENT TOPICAL
Qty: 45 G | Refills: 3 | Status: SHIPPED | OUTPATIENT
Start: 2024-05-28

## 2024-06-11 PROBLEM — L43.9 LICHEN PLANUS: Status: ACTIVE | Noted: 2024-06-11

## 2024-07-10 PROBLEM — G43.109 OPHTHALMIC MIGRAINE: Status: ACTIVE | Noted: 2024-07-10

## 2024-07-23 ENCOUNTER — OFFICE VISIT (OUTPATIENT)
Dept: MEDICAL GROUP | Facility: MEDICAL CENTER | Age: 64
End: 2024-07-23
Payer: COMMERCIAL

## 2024-07-23 VITALS
TEMPERATURE: 97.2 F | WEIGHT: 192 LBS | HEIGHT: 68 IN | SYSTOLIC BLOOD PRESSURE: 118 MMHG | HEART RATE: 56 BPM | DIASTOLIC BLOOD PRESSURE: 82 MMHG | OXYGEN SATURATION: 97 % | BODY MASS INDEX: 29.1 KG/M2 | RESPIRATION RATE: 16 BRPM

## 2024-07-23 DIAGNOSIS — K13.70 MOUTH LESION: ICD-10-CM

## 2024-07-23 DIAGNOSIS — L43.9 LICHEN PLANUS: ICD-10-CM

## 2024-07-23 PROCEDURE — 3079F DIAST BP 80-89 MM HG: CPT | Performed by: PHYSICIAN ASSISTANT

## 2024-07-23 PROCEDURE — 3074F SYST BP LT 130 MM HG: CPT | Performed by: PHYSICIAN ASSISTANT

## 2024-07-23 PROCEDURE — 99203 OFFICE O/P NEW LOW 30 MIN: CPT | Performed by: PHYSICIAN ASSISTANT

## 2024-07-23 ASSESSMENT — FIBROSIS 4 INDEX: FIB4 SCORE: 1.05

## 2024-08-14 ENCOUNTER — OFFICE VISIT (OUTPATIENT)
Dept: DERMATOLOGY | Facility: IMAGING CENTER | Age: 64
End: 2024-08-14
Payer: COMMERCIAL

## 2024-08-14 DIAGNOSIS — L81.4 LENTIGINES: ICD-10-CM

## 2024-08-14 DIAGNOSIS — L72.0 EPIDERMAL CYST: ICD-10-CM

## 2024-08-14 DIAGNOSIS — B35.1 ONYCHOMYCOSIS: ICD-10-CM

## 2024-08-14 DIAGNOSIS — D18.01 CHERRY ANGIOMA: ICD-10-CM

## 2024-08-14 DIAGNOSIS — L43.8 ORAL LICHEN PLANUS: ICD-10-CM

## 2024-08-14 DIAGNOSIS — D22.9 MULTIPLE NEVI: ICD-10-CM

## 2024-08-14 DIAGNOSIS — Z12.83 SKIN CANCER SCREENING: ICD-10-CM

## 2024-08-14 PROCEDURE — 99213 OFFICE O/P EST LOW 20 MIN: CPT | Performed by: NURSE PRACTITIONER

## 2024-08-14 RX ORDER — CLOBETASOL PROPIONATE 0.05 MG/G
GEL TOPICAL
Qty: 30 G | Refills: 3 | Status: SHIPPED | OUTPATIENT
Start: 2024-08-14

## 2024-08-14 NOTE — PROGRESS NOTES
DERMATOLOGY NOTE  FOLLOW UP VISIT       Chief complaint: Follow-Up (HANY) and Skin Lesion     Pt has concerns on Lichen Planus          History of skin cancer: Yes, Details: BCC back 2010   History of precancers/actinic keratoses: No  History of biopsies:Yes, Details:    History of blistering/severe sunburns:Yes, Details:    Family history of skin cancer:Yes, Details: father SCC , melanoma   Family history of atypical moles:No      Allergies   Allergen Reactions    Azithromycin Rash     Rash upper body    Penicillin G Hives and Unspecified        MEDICATIONS:  Medications relevant to specialty reviewed.     REVIEW OF SYSTEMS:   Positive for skin (see HPI)  Negative for fevers and chills       EXAM:  There were no vitals taken for this visit.  Constitutional: Well-developed, well-nourished, and in no distress.     A total body skin exam was performed excluding the genitals per patient preference and including the following areas: head (including face), neck, chest, abdomen, groin/buttocks, back, bilateral upper extremities, and bilateral lower extremities with the following pertinent findings listed below and/or in assessment/plan.     Several scattered 1-3mm bright red macules and thin papules on the scalp trunk  Several tan skin-colored stuck-on waxy papules scattered on the  scalp trunk and extremities  -sun exposed skin of trunk and b/l upper, lower extremities and face with scattered clinically benign light brown reticulated macules all of which were morphologically similar and none of which were suspicious for skin cancer today on exam  Multiple tan medium brown skin-colored macules papules scattered over the trunk, face and extremities--.skdeerm  Epidermal cyst left mid back   Thickened and discolored bilateral great toe nails , left second toe   Faint Jagdish striae to oral mucosa--buccal mucosa, no evidence of other lesion      IMPRESSION / PLAN:    1. Oral lichen planus, favored diagnosis  Discussed  course/nature of disease, per pt, he has been told by periodontist that he has oral lichen planus, but not treating at this time  Shared content from [facility]  States has had bx in the past, will get results  Has been trying to get referral to ENT to repeat bx, working with PCP  In interim, will trial Rx below for symptom control    - Clobetasol Propionate 0.05 % Gel; AAA, thin layer in oral mucose 1-2 times per day  Dispense: 30 g; Refill: 3    2. Cherry angioma  - Benign-appearing nature of lesions discussed during exam.   - Advised to continue to monitor for any return to clinic for new or concerning changes.      3. Lentigines  - Benign-appearing nature of lesions discussed during exam.   - Advised to continue to monitor for any return to clinic for new or concerning changes.      4. Epidermal cyst  Bothersome and would like removed  PA form completed, given to PARs to process and schedule    5. Onychomycosis  Discussed course/nature of disease  Discussed various tx options    6. Multiple nevi  - Benign-appearing nature of lesions discussed during exam.   - Advised to continue to monitor for any return to clinic for new or concerning changes.  - ABCDE's of melanoma discussed/handout given      7. Skin cancer screening  Skin cancer education  discussed importance of sun protective clothing, eyewear in addition to the use of broad spectrum sunscreen with SPF 30 or greater, as well as need for reapplication ~every 2 hours when exposed to UVR/handout given  discussed importance following up for any new or changing lesions as noted in handout given, but every 12 months exams in clinic in the setting of dermatologic history  ABCDE's of melanoma discussed/handout given        Please note that this dictation was created using voice recognition software. I have made every reasonable attempt to correct obvious errors, but I expect that there are errors of grammar and possibly content that I did not discover before  finalizing the note.      Return to clinic in: Return in about 1 year (around 8/14/2025) for HANY, pending PA for cyst excision. and as needed for any new or changing skin lesions.

## 2024-11-16 PROBLEM — R53.83 FATIGUE DUE TO DEPRESSION: Status: ACTIVE | Noted: 2024-11-16

## 2024-11-16 PROBLEM — F32.A FATIGUE DUE TO DEPRESSION: Status: ACTIVE | Noted: 2024-11-16

## 2024-11-16 PROBLEM — N18.1: Status: ACTIVE | Noted: 2024-11-16

## 2024-11-16 PROBLEM — F32.1 CURRENT MODERATE EPISODE OF MAJOR DEPRESSIVE DISORDER (HCC): Status: ACTIVE | Noted: 2024-11-16

## 2024-12-12 PROBLEM — N20.0 NEPHROLITHIASIS: Status: ACTIVE | Noted: 2024-12-12

## 2025-02-04 ENCOUNTER — OFFICE VISIT (OUTPATIENT)
Dept: DERMATOLOGY | Facility: IMAGING CENTER | Age: 65
End: 2025-02-04
Payer: COMMERCIAL

## 2025-02-04 DIAGNOSIS — R20.9 DISTURBANCE OF SKIN SENSATION: ICD-10-CM

## 2025-02-04 DIAGNOSIS — L72.0 EPIDERMAL CYST: ICD-10-CM

## 2025-02-04 PROCEDURE — 11401 EXC TR-EXT B9+MARG 0.6-1 CM: CPT | Performed by: NURSE PRACTITIONER

## 2025-02-04 NOTE — PROGRESS NOTES
DERMATOLOGY NOTE  FOLLOW UP VISIT       Chief complaint: Follow-Up (Procedure )   Epidermal cyst excision to L upper back, bothersome, tender, as abscessed in past and is getting bigger            Allergies   Allergen Reactions    Azithromycin Rash     Rash upper body    Penicillin G Hives and Unspecified        MEDICATIONS:  Medications relevant to specialty reviewed.     REVIEW OF SYSTEMS:   Positive for skin (see HPI)  Negative for fevers and chills       EXAM:  There were no vitals taken for this visit.  Constitutional: Well-developed, well-nourished, and in no distress.     A focused skin exam was performed including the affected areas of the back. Notable findings on exam today listed below and/or in assessment/plan.     Approx. 1.5 cm cystic structure with visible punctum to L upper back      IMPRESSION / PLAN:    1. Epidermal cyst  Procedure Note   Procedure: cyst excision via punch technique  Location: L upper back  Preoperative diagnosis:epidermal cyst  Risks, benefits and alternatives of procedure discussed and written informed consent obtained. Time out completed. Area of biopsy prepped with alcohol. Anesthesia with 1% lidocaine with epinephrine administered with a 30 gauge needle. 8  mm punch incision of site performed. Hemostasis achieved with pressure and 4.0 prolene sutures Xs 4. Vaseline applied to wound with bandage. Patient tolerated procedure well, and there were no complications.  The pathology specimen was sent to the lab via the staff.  Wound care was discussed with the patient.       2. Disturbance of skin sensation    There are no diagnoses linked to this encounter.      Please note that this dictation was created using voice recognition software. I have made every reasonable attempt to correct obvious errors, but I expect that there are errors of grammar and possibly content that I did not discover before finalizing the note.      Return to clinic in: No follow-ups on file. and as needed  for any new or changing skin lesions.